# Patient Record
Sex: MALE | Race: WHITE
[De-identification: names, ages, dates, MRNs, and addresses within clinical notes are randomized per-mention and may not be internally consistent; named-entity substitution may affect disease eponyms.]

---

## 2017-08-30 NOTE — US
EXAM DESCRIPTION: 



Venous,Lower Extremity LT (accession [id number]), Venous,Lower

Extremity RT (accession [id number])



CLINICAL HISTORY: 



LEG PAIN



COMPARISON: 



None Available.



TECHNIQUE: 



Lateral lower extremity venous duplex examination with grayscale,

color Doppler imaging, and spectral pulse Doppler evaluation.



FINDINGS: 



On the right, the examination is normal from the level of the

inguinal ligament at the common femoral vein through the

popliteal region and into the calf. Normal spontaneous phasic

flow with augmentation and compressibility is present throughout.

No filling defects are noted. The greater saphenous vein is

patent with normal flow and compressibility.



On the left, the patient has a history of previous DVT. The

common femoral vein is widely patent with and normally

compressible in the femoral bifurcation including the insertion

of the profunda femoris and the proximal several centimeters of

the superficial femoral are patent. There is long segment

occlusion of the superficial femoral vein from near its insertion

into the common femoral vein extending distally to near the

popliteal vein with a small contracted noncompressible vein with

no flow. The appearance is most typical of a a chronic

thrombosis.



The left popliteal vein is widely patent and normally

compressible. Patency of the peroneal and posterior tibial as

well as saphenous vein is noted.



IMPRESSION: 



1.  Normal right lower extremity deep venous sonography with no

evidence of deep venous thrombosis.

2.  Long segment occlusion with a contracted small thrombosed

superficial femoral vein from near the popliteal vein to near the

common femoral vein consistent with chronic occlusion.

3.  The left deep venous system is patent from the popliteal vein

distally into the leg and patent at the common femoral vein.



Electronically signed by:  Carl Higgins MD  8/30/2017 4:25 PM

CDT Workstation: [id number]

## 2017-08-30 NOTE — US
EXAM DESCRIPTION: 



Venous,Lower Extremity LT (accession [id number]), Venous,Lower

Extremity RT (accession [id number])



CLINICAL HISTORY: 



LEG PAIN



COMPARISON: 



None Available.



TECHNIQUE: 



Lateral lower extremity venous duplex examination with grayscale,

color Doppler imaging, and spectral pulse Doppler evaluation.



FINDINGS: 



On the right, the examination is normal from the level of the

inguinal ligament at the common femoral vein through the

popliteal region and into the calf. Normal spontaneous phasic

flow with augmentation and compressibility is present throughout.

No filling defects are noted. The greater saphenous vein is

patent with normal flow and compressibility.



On the left, the patient has a history of previous DVT. The

common femoral vein is widely patent with and normally

compressible in the femoral bifurcation including the insertion

of the profunda femoris and the proximal several centimeters of

the superficial femoral are patent. There is long segment

occlusion of the superficial femoral vein from near its insertion

into the common femoral vein extending distally to near the

popliteal vein with a small contracted noncompressible vein with

no flow. The appearance is most typical of a a chronic

thrombosis.



The left popliteal vein is widely patent and normally

compressible. Patency of the peroneal and posterior tibial as

well as saphenous vein is noted.



IMPRESSION: 



1.  Normal right lower extremity deep venous sonography with no

evidence of deep venous thrombosis.

2.  Long segment occlusion with a contracted small thrombosed

superficial femoral vein from near the popliteal vein to near the

common femoral vein consistent with chronic occlusion.

3.  The left deep venous system is patent from the popliteal vein

distally into the leg and patent at the common femoral vein.



Electronically signed by:  Carl Higgins MD  8/30/2017 4:25 PM

CDT Workstation: [id number]

## 2017-09-21 ENCOUNTER — HOSPITAL ENCOUNTER (OUTPATIENT)
Dept: HOSPITAL 39 - LAB.O | Age: 72
Discharge: HOME | End: 2017-09-21
Attending: NURSE PRACTITIONER
Payer: MEDICARE

## 2017-09-21 DIAGNOSIS — G44.209: Primary | ICD-10-CM

## 2017-09-22 ENCOUNTER — HOSPITAL ENCOUNTER (OUTPATIENT)
Dept: HOSPITAL 39 - CT | Age: 72
Discharge: HOME | End: 2017-09-22
Attending: NURSE PRACTITIONER
Payer: MEDICARE

## 2017-09-22 DIAGNOSIS — G44.209: Primary | ICD-10-CM

## 2017-09-25 NOTE — CT
EXAM DESCRIPTION: 



Head w/wo Contrast



CLINICAL HISTORY: 



71 years, Male, HEADACHE,  G44.209



COMPARISON: 



None



TECHNIQUE: 



Head CT was performed with and without IV contrast.  This exam

was performed according to our departmental dose-optimization

program, which includes automated exposure control, adjustment of

the mA and/or kV according to patient size and/or use of

iterative reconstruction technique.



FINDINGS: 



Pre-IV contrast images show no acute intracranial hemorrhage. No

midline shift or other mass effect. The ventricles and basilar

cisterns are well maintained. Mild chronic ischemic changes are

present in the periventrical white matter without cortical edema

or sulcal effacement to suggest acute cortical infarct. The basal

ganglia are unremarkable. No posterior fossa lesion. Vascular

calcifications are noted. Visualized paranasal sinuses and orbits

are clear. There is patchy opacification of the ethmoid air

cells. No calvarial fracture. Postcontrast images show only

physiologic vascular enhancement without enhancing intracranial

mass..



IMPRESSION: 



Vascular calcifications and mild chronic ischemic changes, but no

acute intracranial abnormality, intracranial mass or additional

finding to explain patient's symptoms



Electronically signed by:  Ovidio Benitez MD  9/25/2017 9:57 AM CDT

Workstation: [id number]

## 2018-02-02 ENCOUNTER — HOSPITAL ENCOUNTER (OUTPATIENT)
Dept: HOSPITAL 39 - LAB.O | Age: 73
End: 2018-02-02
Attending: NURSE PRACTITIONER
Payer: MEDICARE

## 2018-02-02 DIAGNOSIS — R68.83: Primary | ICD-10-CM

## 2018-10-08 ENCOUNTER — HOSPITAL ENCOUNTER (OUTPATIENT)
Dept: HOSPITAL 39 - AMB | Age: 73
LOS: 1 days | Discharge: HOME | End: 2018-10-09
Attending: OPHTHALMOLOGY
Payer: MEDICARE

## 2018-10-08 DIAGNOSIS — Z88.0: ICD-10-CM

## 2018-10-08 DIAGNOSIS — K21.9: ICD-10-CM

## 2018-10-08 DIAGNOSIS — Z79.899: ICD-10-CM

## 2018-10-08 DIAGNOSIS — H25.11: Primary | ICD-10-CM

## 2018-10-08 DIAGNOSIS — Z87.891: ICD-10-CM

## 2018-10-08 DIAGNOSIS — I25.10: ICD-10-CM

## 2018-10-08 DIAGNOSIS — I10: ICD-10-CM

## 2018-10-08 DIAGNOSIS — Z88.8: ICD-10-CM

## 2018-10-08 PROCEDURE — 66984 XCAPSL CTRC RMVL W/O ECP: CPT

## 2018-10-08 PROCEDURE — 00142 ANES PX ON EYE LENS SURGERY: CPT

## 2018-10-08 RX ADMIN — BRIMONIDINE TARTRATE ONE DROP: 2 SOLUTION OPHTHALMIC at 11:51

## 2018-10-08 RX ADMIN — BRIMONIDINE TARTRATE ONE DROP: 2 SOLUTION OPHTHALMIC at 11:57

## 2018-10-08 RX ADMIN — TOBRAMYCIN ONE DROP: 3 SOLUTION/ DROPS OPHTHALMIC at 11:57

## 2018-10-08 RX ADMIN — PROPARACAINE HYDROCHLORIDE ONE DROP: 5 SOLUTION/ DROPS OPHTHALMIC at 11:35

## 2018-10-08 RX ADMIN — LIDOCAINE HYDROCHLORIDE ONE ML: 10 INJECTION, SOLUTION EPIDURAL; INFILTRATION; INTRACAUDAL; PERINEURAL at 11:42

## 2018-10-08 RX ADMIN — DEXAMETHASONE SODIUM PHOSPHATE ONE DROP: 1 SOLUTION/ DROPS OPHTHALMIC at 11:57

## 2018-10-08 RX ADMIN — DEXAMETHASONE SODIUM PHOSPHATE ONE DROP: 1 SOLUTION/ DROPS OPHTHALMIC at 11:50

## 2018-10-08 RX ADMIN — TOBRAMYCIN ONE DROP: 3 SOLUTION/ DROPS OPHTHALMIC at 11:50

## 2018-10-22 ENCOUNTER — HOSPITAL ENCOUNTER (OUTPATIENT)
Dept: HOSPITAL 39 - AMB | Age: 73
Discharge: HOME | End: 2018-10-22
Attending: OPHTHALMOLOGY
Payer: MEDICARE

## 2018-10-22 DIAGNOSIS — I25.2: ICD-10-CM

## 2018-10-22 DIAGNOSIS — Z95.5: ICD-10-CM

## 2018-10-22 DIAGNOSIS — H25.12: Primary | ICD-10-CM

## 2018-10-22 DIAGNOSIS — Z88.0: ICD-10-CM

## 2018-10-22 DIAGNOSIS — I10: ICD-10-CM

## 2018-10-22 DIAGNOSIS — Z79.899: ICD-10-CM

## 2018-10-22 DIAGNOSIS — K21.9: ICD-10-CM

## 2018-10-22 DIAGNOSIS — Z88.8: ICD-10-CM

## 2018-10-22 DIAGNOSIS — I25.10: ICD-10-CM

## 2018-10-22 PROCEDURE — 66984 XCAPSL CTRC RMVL W/O ECP: CPT

## 2018-10-22 PROCEDURE — 00142 ANES PX ON EYE LENS SURGERY: CPT

## 2019-02-14 ENCOUNTER — HOSPITAL ENCOUNTER (OUTPATIENT)
Dept: HOSPITAL 39 - US | Age: 74
End: 2019-02-14
Attending: NURSE PRACTITIONER
Payer: MEDICARE

## 2019-02-14 DIAGNOSIS — K40.90: Primary | ICD-10-CM

## 2019-02-14 DIAGNOSIS — I82.412: ICD-10-CM

## 2019-02-14 DIAGNOSIS — I70.0: ICD-10-CM

## 2019-02-14 DIAGNOSIS — K86.89: ICD-10-CM

## 2019-02-14 DIAGNOSIS — I70.8: ICD-10-CM

## 2019-02-14 DIAGNOSIS — Z86.718: ICD-10-CM

## 2019-02-14 DIAGNOSIS — K76.0: ICD-10-CM

## 2019-02-14 DIAGNOSIS — I10: ICD-10-CM

## 2019-02-14 NOTE — CT
EXAM DESCRIPTION: 

Abdomen/Pelvis w/wo Contrast: Computed Tomography.



CLINICAL HISTORY: 

LEFT LOWER QUADRANT PAIN. Previous left inguinal hernia. Multiple

abdominal surgeries. Prior abdominal cancer.



COMPARISON: 

Duplex ultrasound evaluation of the left lower extremity deep

venous system.



TECHNIQUE: 

Spiral-axial scans at 5 x 5 mm intervals through the abdomen and

pelvis before and after standard dose nonionic IV contrast. No

oral contrast. Coronal and sagittal 2.0 mm reconstructions.  5 mm

Delayed helical-axial scans, liver through the pubic symphysis. 

No adverse reactions.  Total Exam DLP 1754.22 mGy - cm.  This

exam was performed according to our departmental CT

dose-optimization program which includes automated exposure

control, adjustment of the mA and/or kV according to patient size

and/or use of iterative reconstruction technique; to reduce

radiation dose to as low as reasonably achievable (ALARA).



FINDINGS: 

Lung bases and pleura: Bilateral pleural thickening more on the

right. Right lower lobe pleural parenchymal scars. Coronary

artery and thoracic aortic atherosclerotic calcifications

Liver, Stomach, Spleen, Adrenal Glands: Overall low-density in

the liver. Normal enhancement. Stomach and remaining organs are

unremarkable. Radiodense food or medications in the stomach.

Pancreas, Gallbladder, Ducts: Surgical clips in the gallbladder

fossa with no fluid. Atrophy of the pancreas and fatty

infiltration. Common bile duct unremarkable.

Kidneys and Ureters: Unremarkable.

Mesentery: No fatty stranding or fascial thickening free air or

free fluid.

Aorta: Moderate atherosclerotic calcification more distally with

narrowing of the distal aorta. Bilateral significant narrowing

common iliac arteries.

Small Bowel: Contains fluid and gas intermittently with no

distention or significant air-fluid levels.

Terminal Ileum/Cecum: Minimal distention by gas and fluid. Normal

caliber of the appendix. Normal density of the surrounding fat. 

Colon: Diffuse gas and fecal matter. Redundant sigmoid colon.. No

complications.

Pelvic Organs: TURP defect in the prostate gland which contains

minimal calcifications. Minimal bladder wall thickening. No

pelvic fluid or soft tissue mass.

Spine and Bony Pelvis: Thoracolumbar levoscoliosis. L5-S1 and

L4-5 spondylolisthesis. Significant bilateral L4-5 foraminal

narrowing. Bilateral mild hip arthrosis.

Abdominal Wall/Back Soft Tissues: Small fatty inguinal hernia on

the left not containing bowel. Surgical scarring anterior

abdominal wall. Small midline defect superior to the umbilicus

with slight protrusion of gas-filled small bowel into the defect

but no complete bowel herniation. 



IMPRESSION: 

1. Fatty infiltration of the liver. No ascites. No duct dilation.

Prior cholecystectomy. Fatty infiltration and marked atrophy of

the pancreas.

2. Small fatty inguinal hernia on the left not containing bowel.

Anterior midline supraumbilical defect with loop of small bowel

partially entering the defect but no strangulation or

incarceration. No fatty stranding or fluid collection.

3. L4-5 and L5-S1 spondylosis.

4. Advanced atherosclerotic disease of the distal abdominal aorta

and bilateral common iliac arteries.



Electronically signed by:  Cornel Esteves MD  2/14/2019 2:23 PM CST

Workstation: [id number]

## 2019-02-14 NOTE — US
EXAM DESCRIPTION: 

Venous,Lower Extremity LT: ULTRASOUND.



CLINICAL HISTORY: 

PERSONAL HX OF OTHER VENOUS THROMBOSIS AND EMBOLISM



COMPARISON: 

CT scan of the abdomen and pelvis on the same visit.



TECHNIQUE: 

Gray-scale and doppler sonographic evaluation of the deep venous

system of the left lower extremity.



FINDINGS: 

Doppler evaluation loss of normal color flow and venous waveform

at the bifurcation of the left common femoral vein, left proximal

femoral vein, and left deep femoral vein. Grayscale evaluation

shows these veins are not compressible at the bifurcation. Shows

normal color flow and normal phasicity and augmentation of the

proximal left common femoral vein, mid and distal left femoral

vein, popliteal vein, greater saphenous vein, peroneal, anterior

and posterior tibial vein. These left lower extremity deep veins

(with normal Doppler parameters) were completely compressible;

normal occlusion with transducer pressure. Gray-scale survey

showed no echogenic thrombus within these veins. 



IMPRESSION: 

1.  Duplex ultrasound evaluation of the left lower extremity deep

venous system showing thrombosis at the bifurcation of the left

common femoral vein into the left deep femoral vein and left

femoral vein..

2.  No thrombosis in the remaining left lower extremity deep

veins.



CRITICAL COMMUNICATION:  

The critical value was discussed directly by phone by the

sonographer , Ms. Edel Steel RDMS, (V), with Mr. Martin Hargrove,

family nurse practitioner at approximately 1200 hours, on

February 14, 2019.



Electronically signed by:  Cornel Esteves MD  2/14/2019 1:44 PM CST

Workstation: [id number]

## 2021-02-24 ENCOUNTER — HOSPITAL ENCOUNTER (OUTPATIENT)
Dept: HOSPITAL 39 - GMALP | Age: 76
End: 2021-02-24
Attending: FAMILY MEDICINE
Payer: MEDICARE

## 2021-02-24 DIAGNOSIS — E11.9: ICD-10-CM

## 2021-02-24 DIAGNOSIS — Z12.5: Primary | ICD-10-CM

## 2021-02-24 DIAGNOSIS — I10: ICD-10-CM

## 2021-03-03 ENCOUNTER — HOSPITAL ENCOUNTER (OUTPATIENT)
Dept: HOSPITAL 39 - US | Age: 76
End: 2021-03-03
Attending: FAMILY MEDICINE
Payer: MEDICARE

## 2021-03-03 DIAGNOSIS — Z86.718: ICD-10-CM

## 2021-03-03 DIAGNOSIS — I82.412: Primary | ICD-10-CM

## 2021-03-03 NOTE — US
EXAM DESCRIPTION: 

Venous,Lower Extremity LT: ULTRASOUND.



CLINICAL HISTORY: 

venous thrombosis history



COMPARISON: 

Duplex ultrasound evaluation of the left lower extremity deep

venous system February 2019.



TECHNIQUE: 

Gray-scale and doppler sonographic evaluation of the deep venous

system of the left lower extremity.



FINDINGS: 

Doppler evaluation shows decreased color and lack of phasicity

and augmentation of the left proximal and mid femoral vein.

Grayscale evaluation showing echogenic thrombus and lack of

compressibility with the transducer.



Doppler evaluation showing normal color flow and normal phasicity

and augmentation of the left common femoral vein, left distal

femoral vein, popliteal vein, left greater saphenous vein,

junction with the CFV. Also normal color flow and normal

phasicity and augmentation of the left  peroneal, and posterior

tibial vein.  These left lower extremity deep veins were

completely compressible; normal occlusion with transducer

pressure. Gray-scale survey showed no echogenic thrombus within

these veins. 



IMPRESSION: 

1.  Duplex ultrasound evaluation of the left lower extremity deep

venous system thrombosis in the proximal and mid left femoral

vein. The study in February 2019 showed thrombosis in the

bifurcation of the left common femoral vein, the proximal left

femoral vein and proximal left deep femoral vein.

2.  Normal Doppler and grayscale evaluation of the left common

femoral vein, distal left femoral vein, and the more distal veins

of the deep venous system. Please see above.



Electronically signed by:  Cornel Esteves MD  3/3/2021 4:41 PM [facility]

Workstation: [id number]

## 2022-09-01 ENCOUNTER — APPOINTMENT (RX ONLY)
Dept: URBAN - METROPOLITAN AREA CLINIC 108 | Facility: CLINIC | Age: 77
Setting detail: DERMATOLOGY
End: 2022-09-01

## 2022-09-01 DIAGNOSIS — D22 MELANOCYTIC NEVI: ICD-10-CM

## 2022-09-01 DIAGNOSIS — Z86.007 PERSONAL HISTORY OF IN-SITU NEOPLASM OF SKIN: ICD-10-CM

## 2022-09-01 DIAGNOSIS — D18.0 HEMANGIOMA: ICD-10-CM

## 2022-09-01 DIAGNOSIS — Z71.89 OTHER SPECIFIED COUNSELING: ICD-10-CM

## 2022-09-01 DIAGNOSIS — L82.1 OTHER SEBORRHEIC KERATOSIS: ICD-10-CM

## 2022-09-01 DIAGNOSIS — L57.0 ACTINIC KERATOSIS: ICD-10-CM

## 2022-09-01 DIAGNOSIS — L57.8 OTHER SKIN CHANGES DUE TO CHRONIC EXPOSURE TO NONIONIZING RADIATION: ICD-10-CM

## 2022-09-01 DIAGNOSIS — L81.4 OTHER MELANIN HYPERPIGMENTATION: ICD-10-CM

## 2022-09-01 PROBLEM — D18.01 HEMANGIOMA OF SKIN AND SUBCUTANEOUS TISSUE: Status: ACTIVE | Noted: 2022-09-01

## 2022-09-01 PROBLEM — D22.5 MELANOCYTIC NEVI OF TRUNK: Status: ACTIVE | Noted: 2022-09-01

## 2022-09-01 PROCEDURE — ? LIQUID NITROGEN

## 2022-09-01 PROCEDURE — 17000 DESTRUCT PREMALG LESION: CPT

## 2022-09-01 PROCEDURE — 17003 DESTRUCT PREMALG LES 2-14: CPT

## 2022-09-01 PROCEDURE — 99213 OFFICE O/P EST LOW 20 MIN: CPT | Mod: 25

## 2022-09-01 PROCEDURE — ? COUNSELING

## 2022-09-01 ASSESSMENT — LOCATION DETAILED DESCRIPTION DERM
LOCATION DETAILED: LEFT MIDDLE PROXIMAL INTERPHALANGEAL JOINT
LOCATION DETAILED: RIGHT INFERIOR UPPER BACK
LOCATION DETAILED: RIGHT MID-UPPER BACK
LOCATION DETAILED: LEFT RADIAL DORSAL HAND
LOCATION DETAILED: LEFT ANTERIOR EARLOBE
LOCATION DETAILED: RIGHT RADIAL DORSAL HAND
LOCATION DETAILED: LEFT MEDIAL UPPER BACK
LOCATION DETAILED: STERNUM
LOCATION DETAILED: LEFT SUPERIOR MEDIAL UPPER BACK
LOCATION DETAILED: LEFT VENTRAL PROXIMAL FOREARM
LOCATION DETAILED: LEFT CENTRAL LATERAL NECK

## 2022-09-01 ASSESSMENT — LOCATION ZONE DERM
LOCATION ZONE: EAR
LOCATION ZONE: FINGER
LOCATION ZONE: HAND
LOCATION ZONE: NECK
LOCATION ZONE: ARM
LOCATION ZONE: TRUNK

## 2022-09-01 ASSESSMENT — LOCATION SIMPLE DESCRIPTION DERM
LOCATION SIMPLE: NECK
LOCATION SIMPLE: LEFT FOREARM
LOCATION SIMPLE: LEFT UPPER BACK
LOCATION SIMPLE: LEFT EAR
LOCATION SIMPLE: CHEST
LOCATION SIMPLE: RIGHT HAND
LOCATION SIMPLE: LEFT MIDDLE FINGER
LOCATION SIMPLE: RIGHT UPPER BACK
LOCATION SIMPLE: LEFT HAND

## 2022-09-01 NOTE — PROCEDURE: LIQUID NITROGEN
Render Post-Care Instructions In Note?: no
Number Of Freeze-Thaw Cycles: 2 freeze-thaw cycles
Consent: The patient's consent was obtained including but not limited to risks of crusting, scabbing, blistering, scarring, darker or lighter pigmentary change, recurrence, incomplete removal and infection.
Show Aperture Variable?: Yes
Duration Of Freeze Thaw-Cycle (Seconds): 0
Detail Level: Detailed
Post-Care Instructions: I reviewed with the patient in detail post-care instructions. Patient is to wear sunprotection, and avoid picking at any of the treated lesions. Pt may apply Vaseline to crusted or scabbing areas.

## 2023-03-02 ENCOUNTER — APPOINTMENT (RX ONLY)
Dept: URBAN - METROPOLITAN AREA CLINIC 108 | Facility: CLINIC | Age: 78
Setting detail: DERMATOLOGY
End: 2023-03-02

## 2023-03-02 DIAGNOSIS — L57.0 ACTINIC KERATOSIS: ICD-10-CM

## 2023-03-02 DIAGNOSIS — D22 MELANOCYTIC NEVI: ICD-10-CM

## 2023-03-02 DIAGNOSIS — L85.3 XEROSIS CUTIS: ICD-10-CM

## 2023-03-02 DIAGNOSIS — D18.0 HEMANGIOMA: ICD-10-CM

## 2023-03-02 DIAGNOSIS — L82.1 OTHER SEBORRHEIC KERATOSIS: ICD-10-CM

## 2023-03-02 DIAGNOSIS — L81.4 OTHER MELANIN HYPERPIGMENTATION: ICD-10-CM

## 2023-03-02 PROBLEM — D18.01 HEMANGIOMA OF SKIN AND SUBCUTANEOUS TISSUE: Status: ACTIVE | Noted: 2023-03-02

## 2023-03-02 PROBLEM — D22.61 MELANOCYTIC NEVI OF RIGHT UPPER LIMB, INCLUDING SHOULDER: Status: ACTIVE | Noted: 2023-03-02

## 2023-03-02 PROBLEM — D22.5 MELANOCYTIC NEVI OF TRUNK: Status: ACTIVE | Noted: 2023-03-02

## 2023-03-02 PROBLEM — D22.62 MELANOCYTIC NEVI OF LEFT UPPER LIMB, INCLUDING SHOULDER: Status: ACTIVE | Noted: 2023-03-02

## 2023-03-02 PROCEDURE — 99214 OFFICE O/P EST MOD 30 MIN: CPT | Mod: 25

## 2023-03-02 PROCEDURE — ? LIQUID NITROGEN

## 2023-03-02 PROCEDURE — ? PRESCRIPTION SAMPLES PROVIDED

## 2023-03-02 PROCEDURE — ? COUNSELING

## 2023-03-02 PROCEDURE — 17000 DESTRUCT PREMALG LESION: CPT

## 2023-03-02 PROCEDURE — ? SUNSCREEN RECOMMENDATIONS

## 2023-03-02 PROCEDURE — 17003 DESTRUCT PREMALG LES 2-14: CPT

## 2023-03-02 ASSESSMENT — LOCATION SIMPLE DESCRIPTION DERM
LOCATION SIMPLE: LEFT FOREHEAD
LOCATION SIMPLE: RIGHT HAND
LOCATION SIMPLE: CHEST
LOCATION SIMPLE: ABDOMEN
LOCATION SIMPLE: LEFT HAND
LOCATION SIMPLE: RIGHT TEMPLE
LOCATION SIMPLE: LEFT ZYGOMA
LOCATION SIMPLE: LEFT FOREARM
LOCATION SIMPLE: RIGHT FOREARM
LOCATION SIMPLE: RIGHT UPPER BACK
LOCATION SIMPLE: UPPER BACK
LOCATION SIMPLE: LEFT UPPER ARM
LOCATION SIMPLE: LEFT CHEEK
LOCATION SIMPLE: RIGHT UPPER ARM

## 2023-03-02 ASSESSMENT — LOCATION DETAILED DESCRIPTION DERM
LOCATION DETAILED: LEFT ANTERIOR DISTAL UPPER ARM
LOCATION DETAILED: RIGHT CENTRAL TEMPLE
LOCATION DETAILED: LEFT RADIAL DORSAL HAND
LOCATION DETAILED: RIGHT ANTERIOR PROXIMAL UPPER ARM
LOCATION DETAILED: LEFT CENTRAL ZYGOMA
LOCATION DETAILED: RIGHT RADIAL DORSAL HAND
LOCATION DETAILED: RIGHT ANTERIOR DISTAL UPPER ARM
LOCATION DETAILED: LEFT MEDIAL SUPERIOR CHEST
LOCATION DETAILED: RIGHT SUPERIOR MEDIAL UPPER BACK
LOCATION DETAILED: LEFT VENTRAL PROXIMAL FOREARM
LOCATION DETAILED: SUPERIOR THORACIC SPINE
LOCATION DETAILED: LEFT MEDIAL FOREHEAD
LOCATION DETAILED: RIGHT VENTRAL PROXIMAL FOREARM
LOCATION DETAILED: INFERIOR THORACIC SPINE
LOCATION DETAILED: EPIGASTRIC SKIN
LOCATION DETAILED: LEFT SUPERIOR CENTRAL MALAR CHEEK
LOCATION DETAILED: LOWER STERNUM

## 2023-03-02 ASSESSMENT — LOCATION ZONE DERM
LOCATION ZONE: TRUNK
LOCATION ZONE: ARM
LOCATION ZONE: HAND
LOCATION ZONE: FACE

## 2023-03-02 NOTE — PROCEDURE: LIQUID NITROGEN
Application Tool (Optional): Liquid Nitrogen Sprayer
Duration Of Freeze Thaw-Cycle (Seconds): 1
Consent: The patient's consent was obtained including but not limited to risks of crusting, scabbing, blistering, scarring, darker or lighter pigmentary change, recurrence, incomplete removal and infection.
Render Post-Care Instructions In Note?: no
Post-Care Instructions: I reviewed with the patient in detail post-care instructions. Patient is to wear sunprotection, and avoid picking at any of the treated lesions. Pt may apply Vaseline to crusted or scabbing areas.
Detail Level: Simple
Show Applicator Variable?: Yes
Number Of Freeze-Thaw Cycles: 3 freeze-thaw cycles

## 2023-08-24 ENCOUNTER — APPOINTMENT (RX ONLY)
Dept: URBAN - METROPOLITAN AREA CLINIC 108 | Facility: CLINIC | Age: 78
Setting detail: DERMATOLOGY
End: 2023-08-24

## 2023-08-24 DIAGNOSIS — D18.0 HEMANGIOMA: ICD-10-CM

## 2023-08-24 DIAGNOSIS — L81.4 OTHER MELANIN HYPERPIGMENTATION: ICD-10-CM

## 2023-08-24 DIAGNOSIS — L57.0 ACTINIC KERATOSIS: ICD-10-CM

## 2023-08-24 DIAGNOSIS — Z71.89 OTHER SPECIFIED COUNSELING: ICD-10-CM

## 2023-08-24 DIAGNOSIS — L82.1 OTHER SEBORRHEIC KERATOSIS: ICD-10-CM

## 2023-08-24 DIAGNOSIS — L57.8 OTHER SKIN CHANGES DUE TO CHRONIC EXPOSURE TO NONIONIZING RADIATION: ICD-10-CM

## 2023-08-24 DIAGNOSIS — L82.0 INFLAMED SEBORRHEIC KERATOSIS: ICD-10-CM

## 2023-08-24 DIAGNOSIS — L85.3 XEROSIS CUTIS: ICD-10-CM

## 2023-08-24 PROBLEM — D18.01 HEMANGIOMA OF SKIN AND SUBCUTANEOUS TISSUE: Status: ACTIVE | Noted: 2023-08-24

## 2023-08-24 PROCEDURE — ? COUNSELING

## 2023-08-24 PROCEDURE — 99213 OFFICE O/P EST LOW 20 MIN: CPT | Mod: 25

## 2023-08-24 PROCEDURE — ? LIQUID NITROGEN

## 2023-08-24 PROCEDURE — 17110 DESTRUCTION B9 LES UP TO 14: CPT

## 2023-08-24 PROCEDURE — 17000 DESTRUCT PREMALG LESION: CPT | Mod: 59

## 2023-08-24 ASSESSMENT — LOCATION ZONE DERM
LOCATION ZONE: NECK
LOCATION ZONE: FACE
LOCATION ZONE: TRUNK
LOCATION ZONE: SCALP

## 2023-08-24 ASSESSMENT — LOCATION SIMPLE DESCRIPTION DERM
LOCATION SIMPLE: RIGHT FOREHEAD
LOCATION SIMPLE: RIGHT SCALP
LOCATION SIMPLE: POSTERIOR NECK
LOCATION SIMPLE: LEFT UPPER BACK
LOCATION SIMPLE: RIGHT UPPER BACK

## 2023-08-24 ASSESSMENT — LOCATION DETAILED DESCRIPTION DERM
LOCATION DETAILED: RIGHT INFERIOR UPPER BACK
LOCATION DETAILED: RIGHT SUPERIOR FOREHEAD
LOCATION DETAILED: RIGHT LATERAL TRAPEZIAL NECK
LOCATION DETAILED: LEFT SUPERIOR UPPER BACK
LOCATION DETAILED: RIGHT MEDIAL FRONTAL SCALP

## 2023-08-24 NOTE — PROCEDURE: LIQUID NITROGEN
Show Aperture Variable?: Yes
Render Post-Care Instructions In Note?: no
Consent: The patient's consent was obtained including but not limited to risks of crusting, scabbing, blistering, scarring, darker or lighter pigmentary change, recurrence, incomplete removal and infection.
Medical Necessity Clause: This procedure was medically necessary because the lesions that were treated were:
Medical Necessity Information: It is in your best interest to select a reason for this procedure from the list below. All of these items fulfill various CMS LCD requirements except the new and changing color options.
Post-Care Instructions: I reviewed with the patient in detail post-care instructions. Patient is to wear sunprotection, and avoid picking at any of the treated lesions. Pt may apply Vaseline to crusted or scabbing areas.
Detail Level: Detailed
Spray Paint Text: The liquid nitrogen was applied to the skin utilizing a spray paint frosting technique.
Detail Level: Simple
Application Tool (Optional): Liquid Nitrogen Sprayer
Number Of Freeze-Thaw Cycles: 2 freeze-thaw cycles
Duration Of Freeze Thaw-Cycle (Seconds): 1